# Patient Record
Sex: MALE | Race: WHITE | NOT HISPANIC OR LATINO | Employment: FULL TIME | URBAN - METROPOLITAN AREA
[De-identification: names, ages, dates, MRNs, and addresses within clinical notes are randomized per-mention and may not be internally consistent; named-entity substitution may affect disease eponyms.]

---

## 2018-07-04 ENCOUNTER — HOSPITAL ENCOUNTER (EMERGENCY)
Facility: HOSPITAL | Age: 28
Discharge: HOME | End: 2018-07-04
Attending: EMERGENCY MEDICINE

## 2018-07-04 VITALS
RESPIRATION RATE: 15 BRPM | WEIGHT: 230 LBS | DIASTOLIC BLOOD PRESSURE: 74 MMHG | TEMPERATURE: 97.6 F | HEART RATE: 87 BPM | SYSTOLIC BLOOD PRESSURE: 125 MMHG | HEIGHT: 73 IN | BODY MASS INDEX: 30.48 KG/M2 | OXYGEN SATURATION: 96 %

## 2018-07-04 DIAGNOSIS — T22.012A: Primary | ICD-10-CM

## 2018-07-04 PROCEDURE — 90715 TDAP VACCINE 7 YRS/> IM: CPT | Performed by: EMERGENCY MEDICINE

## 2018-07-04 PROCEDURE — 99283 EMERGENCY DEPT VISIT LOW MDM: CPT

## 2018-07-04 PROCEDURE — 90471 IMMUNIZATION ADMIN: CPT

## 2018-07-04 RX ORDER — OXYCODONE HYDROCHLORIDE AND ACETAMINOPHEN 5; 325 MG/1; MG/1
1 TABLET ORAL ONCE
Status: COMPLETED | OUTPATIENT
Start: 2018-07-04 | End: 2018-07-04

## 2018-07-04 RX ORDER — BACITRACIN ZINC AND POLYMYXIN B SULFATE 500; 1000 [USP'U]/G; [USP'U]/G
OINTMENT TOPICAL 2 TIMES DAILY
Qty: 30 G | Refills: 0 | Status: SHIPPED | OUTPATIENT
Start: 2018-07-04

## 2018-07-04 RX ORDER — GINSENG 100 MG
1 CAPSULE ORAL ONCE
Status: COMPLETED | OUTPATIENT
Start: 2018-07-04 | End: 2018-07-04

## 2018-07-04 RX ORDER — OXYCODONE HYDROCHLORIDE AND ACETAMINOPHEN 5; 325 MG/1; MG/1
1 TABLET ORAL EVERY 6 HOURS PRN
Qty: 20 TABLET | Refills: 0 | Status: SHIPPED | OUTPATIENT
Start: 2018-07-04

## 2018-07-04 RX ADMIN — TETANUS TOXOID, REDUCED DIPHTHERIA TOXOID AND ACELLULAR PERTUSSIS VACCINE, ADSORBED 0.5 ML: 5; 2.5; 8; 8; 2.5 SUSPENSION INTRAMUSCULAR at 13:52

## 2018-07-04 RX ADMIN — BACITRACIN ZINC 1 LARGE APPLICATION: 500 OINTMENT TOPICAL at 13:48

## 2018-07-04 RX ADMIN — OXYCODONE HYDROCHLORIDE AND ACETAMINOPHEN 1 TABLET: 5; 325 TABLET ORAL at 13:48
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
## 2018-07-04 NOTE — DISCHARGE INSTRUCTIONS
Bacitracin daily dressing comma rinse completely and redressed  Tylenol or Motrin for mild pain  Percocet 1 every 6 hours if needed for severe pain caution narcotic will make a sleepy  Follow up with your family doctor  Second Degree Burn   WHAT YOU NEED TO KNOW:   A second degree burn is also called a partial thickness burn  Your skin contains 3 layers  A second degree burn occurs when the first layer and some of the second layer are burned  This type of burn usually heals within 2 to 3 weeks with some scarring  DISCHARGE INSTRUCTIONS:   Return to the emergency department if:   · You have a fast heartbeat or breathing  · You are not urinating  Contact your healthcare provider or burn specialist if:   · You have a fever  · You have increased redness, numbness, or swelling in the burn area  · Your wound or bandage is leaking pus and has a bad smell  · Your pain does not get better, or gets worse, even after you take pain medicine  · You have a dry mouth or eyes  · You are overly thirsty or tired  · You have dark yellow urine or urinate less than usual     · You have a headache or feel dizzy  · You have questions or concerns about your condition or care  Medicines:   · Medicines  may be given to decrease pain, prevent infection, or help your burn heal  They may be given as a pill or as an ointment applied to your skin  · Take your medicine as directed  Contact your healthcare provider if you think your medicine is not helping or if you have side effects  Tell him or her if you are allergic to any medicine  Keep a list of the medicines, vitamins, and herbs you take  Include the amounts, and when and why you take them  Bring the list or the pill bottles to follow-up visits  Carry your medicine list with you in case of an emergency  Follow up with your healthcare provider or burn specialist as directed: You may need to return to have your wound checked and your bandage changed   Write down your questions so you remember to ask them during your visits  Burn care:   · Wash your hands with soap and water and remove old bandages  You may need to soak the bandage in water before you remove it so it will not stick to your wound  · Gently clean the burned area daily with mild soap and water, and pat dry  Look for any swelling or redness around the burn  Do not break closed blisters, because this increases the risk for infection  · Apply cream or ointment to the burn with a cotton swab  Place a nonstick bandage over your burn  · Wrap a layer of gauze around the bandage to hold it in place  The wrap should be snug but not tight  It is too tight if you feel tingling or lose feeling in that area  · Apply gentle pressure for a few minutes if bleeding occurs  · Elevate your burned arm or leg above the level of your heart as often as you can  This will help decrease swelling and pain  Prop your burned arm or leg on pillows or blankets to keep it elevated comfortably  Drink liquids as directed: You may need to drink extra liquid to help prevent dehydration  Ask how much liquid to drink each day and which liquids are best for you  Physical therapy:  Your muscles and joints may not work well after a second degree burn  A physical therapist teaches you exercises to help improve movement and strength, and to decrease pain  Prevent second degree burns:   · Do not leave cups, mugs, or bowls containing hot liquids at the edge of a table  Keep pot handles turned away from the stove front  · Do not leave a lit cigarette  Discard it properly  Keep cigarette lighters and matches in a safe place where children cannot reach them  · Keep your water heater setting to low or medium  © 2017 [employer] Information is for End User's use only and may not be sold, redistributed or otherwise used for commercial purposes   All illustrations and images included in CareNotes® are the copyrighted property of [employer]  or Lee Rodriguez  The above information is an  only  It is not intended as medical advice for individual conditions or treatments  Talk to your doctor, nurse or pharmacist before following any medical regimen to see if it is safe and effective for you

## 2018-12-06 ENCOUNTER — APPOINTMENT (OUTPATIENT)
Dept: OCCUPATIONAL MEDICINE | Age: 28
End: 2018-12-06

## 2022-09-24 ENCOUNTER — HOSPITAL ENCOUNTER (EMERGENCY)
Facility: HOSPITAL | Age: 32
Discharge: HOME | End: 2022-09-24
Attending: EMERGENCY MEDICINE
Payer: COMMERCIAL

## 2022-09-24 VITALS
RESPIRATION RATE: 18 BRPM | HEIGHT: 73 IN | TEMPERATURE: 98 F | BODY MASS INDEX: 33.8 KG/M2 | OXYGEN SATURATION: 98 % | SYSTOLIC BLOOD PRESSURE: 144 MMHG | HEART RATE: 100 BPM | WEIGHT: 255 LBS | DIASTOLIC BLOOD PRESSURE: 78 MMHG

## 2022-09-24 DIAGNOSIS — L03.011 PARONYCHIA OF RIGHT INDEX FINGER: Primary | ICD-10-CM

## 2022-09-24 PROCEDURE — 99283 EMERGENCY DEPT VISIT LOW MDM: CPT

## 2022-09-24 PROCEDURE — 26011 DRAINAGE OF FINGER ABSCESS: CPT | Performed by: EMERGENCY MEDICINE

## 2022-09-24 PROCEDURE — 99282 EMERGENCY DEPT VISIT SF MDM: CPT | Performed by: EMERGENCY MEDICINE

## 2022-09-24 RX ORDER — SULFAMETHOXAZOLE AND TRIMETHOPRIM 800; 160 MG/1; MG/1
1 TABLET ORAL EVERY 12 HOURS SCHEDULED
COMMUNITY

## 2022-09-24 RX ORDER — LIDOCAINE HYDROCHLORIDE 10 MG/ML
5 INJECTION, SOLUTION EPIDURAL; INFILTRATION; INTRACAUDAL; PERINEURAL ONCE
Status: DISCONTINUED | OUTPATIENT
Start: 2022-09-24 | End: 2022-09-24 | Stop reason: HOSPADM
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
## 2022-12-20 ENCOUNTER — APPOINTMENT (EMERGENCY)
Dept: RADIOLOGY | Facility: HOSPITAL | Age: 32
End: 2022-12-20

## 2022-12-20 ENCOUNTER — HOSPITAL ENCOUNTER (EMERGENCY)
Facility: HOSPITAL | Age: 32
Discharge: HOME | End: 2022-12-20
Attending: EMERGENCY MEDICINE

## 2022-12-20 VITALS
WEIGHT: 265 LBS | TEMPERATURE: 98.6 F | OXYGEN SATURATION: 94 % | HEIGHT: 73 IN | RESPIRATION RATE: 16 BRPM | BODY MASS INDEX: 35.12 KG/M2 | DIASTOLIC BLOOD PRESSURE: 89 MMHG | HEART RATE: 92 BPM | SYSTOLIC BLOOD PRESSURE: 150 MMHG

## 2022-12-20 DIAGNOSIS — M25.511 RIGHT SHOULDER PAIN: Primary | ICD-10-CM

## 2022-12-20 RX ORDER — ACETAMINOPHEN 325 MG/1
975 TABLET ORAL ONCE
Status: COMPLETED | OUTPATIENT
Start: 2022-12-20 | End: 2022-12-20

## 2022-12-20 RX ADMIN — ACETAMINOPHEN 975 MG: 325 TABLET ORAL at 11:51

## 2022-12-20 NOTE — ED PROVIDER NOTES
History  Chief Complaint   Patient presents with   • Shoulder Injury     Evan Villanueva this morning injuring right shoulder  Patient is a 47-year-old male no significant past medical medical history arriving for evaluation of right shoulder injury  Patient reports that he fell on his shoulder this morning around 5 to 5:30 AM   Patient states that he was on his way to work at that point  Patient states that he caught himself with his arm and mamta his shoulder  Patient states that he felt like her shoulder popped out, and then when he went to range his shoulder he felt it pop back in  Patient reports that decreased range of motion secondary to pain because of the right shoulder pain  Patient denies hitting his head or neck  Patient denies any chest abdomen pelvis pain          Prior to Admission Medications   Prescriptions Last Dose Informant Patient Reported? Taking?   ibuprofen (MOTRIN) 600 mg tablet   No No   Sig: Take 1 tablet by mouth every 6 (six) hours as needed for mild pain   mupirocin (BACTROBAN) 2 % ointment   Yes No   Sig: Apply 1 application topically 3 (three) times a day      Facility-Administered Medications: None       History reviewed  No pertinent past medical history  History reviewed  No pertinent surgical history  History reviewed  No pertinent family history  I have reviewed and agree with the history as documented  E-Cigarette/Vaping     E-Cigarette/Vaping Substances     Social History     Tobacco Use   • Smoking status: Every Day     Packs/day: 0 50     Types: Cigarettes   • Smokeless tobacco: Never   Substance Use Topics   • Alcohol use: No   • Drug use: No       Review of Systems   Constitutional: Negative  HENT: Negative  Eyes: Negative  Respiratory: Negative  Cardiovascular: Negative  Gastrointestinal: Negative  Endocrine: Negative  Genitourinary: Negative  Musculoskeletal: Negative  Skin: Negative  Allergic/Immunologic: Negative  Neurological: Negative  Hematological: Negative  Psychiatric/Behavioral: Negative  All other systems reviewed and are negative  Physical Exam  Physical Exam  Vitals and nursing note reviewed  Constitutional:       Appearance: Normal appearance  He is normal weight  HENT:      Head: Normocephalic  Right Ear: External ear normal       Left Ear: External ear normal       Nose: Nose normal       Mouth/Throat:      Mouth: Mucous membranes are moist    Eyes:      Extraocular Movements: Extraocular movements intact  Cardiovascular:      Rate and Rhythm: Normal rate and regular rhythm  Pulses: Normal pulses  Heart sounds: Normal heart sounds  Pulmonary:      Effort: Pulmonary effort is normal    Abdominal:      General: Abdomen is flat  Musculoskeletal:         General: Tenderness and signs of injury present  Arms:       Cervical back: Normal range of motion and neck supple  Skin:     General: Skin is warm  Capillary Refill: Capillary refill takes less than 2 seconds  Neurological:      General: No focal deficit present  Mental Status: He is alert and oriented to person, place, and time           Vital Signs  ED Triage Vitals [12/20/22 1106]   Temperature Pulse Respirations Blood Pressure SpO2   98 6 °F (37 °C) 92 16 150/89 94 %      Temp Source Heart Rate Source Patient Position - Orthostatic VS BP Location FiO2 (%)   Tympanic Monitor Sitting Left arm --      Pain Score       2           Vitals:    12/20/22 1106   BP: 150/89   Pulse: 92   Patient Position - Orthostatic VS: Sitting         Visual Acuity      ED Medications  Medications   acetaminophen (TYLENOL) tablet 975 mg (975 mg Oral Given 12/20/22 1151)       Diagnostic Studies  Results Reviewed     None                 XR shoulder 2+ views RIGHT   ED Interpretation by MOSES Briseno (12/20 1140)   No acute osseous abnormality      Final Result by Shon Renteria MD (12/20 3720)      No acute osseous abnormality  Workstation performed: [id number]                    Procedures  Procedures         ED Course                               SBIRT 22yo+    Flowsheet Row Most Recent Value   SBIRT (25 yo +)    In order to provide better care to our patients, we are screening all of our patients for alcohol and drug use  Would it be okay to ask you these screening questions? No Filed at: 12/20/2022 1202                    MDM  Number of Diagnoses or Management Options  Right shoulder pain  Diagnosis management comments: DDx: shoulder dislocation, ligamentous injury not visualized by xray  Reports that this is not a work related injury, and injured it prior to going to work  Patient reports that he felt his shoulder go out, and then went back in  He has no acute dislocation, or fracture on x-ray  Pt is neurovascularly intact patient placed in a right shoulder sling  Discussed with patient limitations of x-rays  Discussed that they do not see ligaments, or tendons  Discussed that patient likely has an injury to these  Discussed that he needs to follow-up with orthopedics  Discussed P R I C E  discussed purpose of sling  Discussed that he needs to remove his arm from the sling and do gentle movements with his arm  Ambulatory referral to orthopedics placed  Provided with Tylenol for pain  Discussed Tylenol/Motrin dosing for pain  Discussed strict return precautions  Amount and/or Complexity of Data Reviewed  Tests in the radiology section of CPT®: ordered and reviewed    Risk of Complications, Morbidity, and/or Mortality  General comments: Pt arrived with shoulder injury  Aware to follow up with ortho  Sling provided  Reviewed reasons to return to ed  Patient verbalized understanding of diagnosis and agreement with discharge plan of care as well as understanding of reasons to return to ed       Patient Progress  Patient progress: stable      Disposition  Final diagnoses:   Right shoulder pain Time reflects when diagnosis was documented in both MDM as applicable and the Disposition within this note     Time User Action Codes Description Comment    12/20/2022 11:47 AM Grace Poag Add [M25 511] Right shoulder pain       ED Disposition     ED Disposition   Discharge    Condition   Stable    Date/Time   Tue Dec 20, 2022 11:47 AM    Comment   Norah Woods discharge to home/self care                 Follow-up Information     Follow up With Specialties Details Why Contact Info Additional [address] Emergency Department Emergency Medicine Go to  If symptoms worsen [address] [place] [id number] [phone] [facility] Emergency Department, [address] [place] [address] Dr, Chandler asher, Roseann Abel [address] [facility] [facility] Orthopedic Surgery Schedule an appointment as soon as possible for a visit in 1 day For further evaluation of symptoms [facility] [place] [id number] [address] [facility] asher, [address], [place], [place] [address]          Discharge Medication List as of 12/20/2022 11:52 AM      CONTINUE these medications which have NOT CHANGED    Details   ibuprofen (MOTRIN) 600 mg tablet Take 1 tablet by mouth every 6 (six) hours as needed for mild pain, Starting 10/13/2016, Until Discontinued, Print      mupirocin (BACTROBAN) 2 % ointment Apply 1 application topically 3 (three) times a day, Historical Med                 PDMP Review     None          ED Provider  Electronically Signed by           MOSES Craig  12/21/22 8647

## 2022-12-20 NOTE — Clinical Note
Giuliana Adler was seen and treated in our emergency department on 12/20/2022  No restrictions    No work until cleared by Family Doctor/Orthopedics        Diagnosis:     Cehrie Quintanilla    He may return on this date: If you have any questions or concerns, please don't hesitate to call        MOSES Gonzales    ______________________________           _______________          _______________  Hospital Representative                              Date                                Time

## 2022-12-20 NOTE — DISCHARGE INSTRUCTIONS
Please make appointment with orthopedics today  Continue take Tylenol and Motrin as needed for pain  A referral has been made to orthopedic surgery  Remove sling periodically for gentle movement

## 2022-12-20 NOTE — Clinical Note
Giuliana Adler was seen and treated in our emergency department on 12/20/2022  No restrictions    No work until cleared by Family Doctor/Orthopedics        Diagnosis:     Cherie Quintanilla    He may return on this date: If you have any questions or concerns, please don't hesitate to call        MOSES Gonzales    ______________________________           _______________          _______________  Hospital Representative                              Date                                Time

## 2024-03-25 ENCOUNTER — OFFICE VISIT (OUTPATIENT)
Dept: URGENT CARE | Age: 34
End: 2024-03-25

## 2024-03-25 VITALS
BODY MASS INDEX: 34.19 KG/M2 | HEART RATE: 96 BPM | SYSTOLIC BLOOD PRESSURE: 126 MMHG | TEMPERATURE: 98.2 F | OXYGEN SATURATION: 98 % | WEIGHT: 258 LBS | RESPIRATION RATE: 20 BRPM | DIASTOLIC BLOOD PRESSURE: 84 MMHG | HEIGHT: 73 IN

## 2024-03-25 DIAGNOSIS — R05.1 ACUTE COUGH: Primary | ICD-10-CM

## 2024-03-25 PROCEDURE — 99204 OFFICE O/P NEW MOD 45 MIN: CPT

## 2024-03-25 NOTE — LETTER
March 25, 2024     Patient: Dagoberto Norris   YOB: 1990   Date of Visit: 3/25/2024       To Whom it May Concern:    Dagoberto Norris was seen in my clinic on 3/25/2024. He was cleared to RTW as of 03/25/2024.    If you have any questions or concerns, please don't hesitate to call.         Sincerely,          MOSES Pena        CC: No Recipients

## 2024-06-03 ENCOUNTER — APPOINTMENT (OUTPATIENT)
Dept: URGENT CARE | Facility: MEDICAL CENTER | Age: 34
End: 2024-06-03

## 2024-10-07 ENCOUNTER — APPOINTMENT (OUTPATIENT)
Dept: URGENT CARE | Age: 34
End: 2024-10-07

## 2024-11-08 ENCOUNTER — APPOINTMENT (OUTPATIENT)
Dept: URGENT CARE | Age: 34
End: 2024-11-08

## 2024-11-25 ENCOUNTER — APPOINTMENT (OUTPATIENT)
Dept: URGENT CARE | Facility: CLINIC | Age: 34
End: 2024-11-25

## 2025-06-30 NOTE — PROGRESS NOTES
[facility]        NAME: Dagoberto Norris is a 33 y.o. male  : 1990    MRN: [id number]  DATE: 2024  TIME: 11:24 AM    Assessment and Plan   Acute cough [R05.1]  1. Acute cough          Cough, PND for a week. No fevers. No OTC meds taken- tried tessalon a couple times. BS clear. Discussed symptom management.    Patient Instructions       Follow up with PCP ias needed    If tests have been performed at [facility], our office will contact you with results if changes need to be made to the care plan discussed with you at the visit.  You can review your full results on [facility].    Chief Complaint     Chief Complaint   Patient presents with    Cough     Pt started with productive cough 1 1/2 weeks ago. Denies fevers, SOB.  Taking Tessalon perles without relief.           History of Present Illness       Cough, PND for a week. No fevers. No OTC meds taken- tried tessalon a couple times. BS clear. Discussed symptom management.     Cough  Associated symptoms include postnasal drip. Pertinent negatives include no fever.       Review of Systems   Review of Systems   Constitutional:  Negative for activity change, appetite change and fever.   HENT:  Positive for congestion and postnasal drip.    Respiratory:  Positive for cough.    All other systems reviewed and are negative.        Current Medications       Current Outpatient Medications:     ibuprofen (MOTRIN) 600 mg tablet, Take 1 tablet by mouth every 6 (six) hours as needed for mild pain (Patient not taking: Reported on 3/25/2024), Disp: 30 tablet, Rfl: 0    mupirocin (BACTROBAN) 2 % ointment, Apply 1 application topically 3 (three) times a day (Patient not taking: Reported on 3/25/2024), Disp: , Rfl:     Current Allergies     Allergies as of 2024    (No Known Allergies)            The following portions of the patient's history were reviewed and updated as appropriate: allergies, current medications, past family history, past  "medical history, past social history, past surgical history and problem list.     History reviewed. No pertinent past medical history.    Past Surgical History:   Procedure Laterality Date    CHOLECYSTECTOMY         History reviewed. No pertinent family history.      Medications have been verified.        Objective   /84 (BP Location: Right arm, Patient Position: Sitting, Cuff Size: Standard)   Pulse 96   Temp 98.2 °F (36.8 °C) (Tympanic)   Resp 20   Ht 6' 1\" (1.854 m)   Wt 117 kg (258 lb)   SpO2 98%   BMI 34.04 kg/m²   No LMP for male patient.       Physical Exam     Physical Exam  Vitals reviewed.   Constitutional:       Appearance: Normal appearance.   HENT:      Right Ear: Tympanic membrane normal.      Left Ear: Tympanic membrane normal.      Nose: Congestion present.   Cardiovascular:      Rate and Rhythm: Normal rate and regular rhythm.      Pulses: Normal pulses.      Heart sounds: Normal heart sounds.   Pulmonary:      Effort: Pulmonary effort is normal.      Breath sounds: Normal breath sounds.   Lymphadenopathy:      Cervical: No cervical adenopathy.   Neurological:      Mental Status: He is alert.                   " To get better and follow your care plan as instructed.